# Patient Record
Sex: FEMALE | Race: WHITE
[De-identification: names, ages, dates, MRNs, and addresses within clinical notes are randomized per-mention and may not be internally consistent; named-entity substitution may affect disease eponyms.]

---

## 2018-04-13 ENCOUNTER — HOSPITAL ENCOUNTER (OUTPATIENT)
Dept: HOSPITAL 74 - JASU-SURG | Age: 63
Discharge: HOME | End: 2018-04-13
Attending: OBSTETRICS & GYNECOLOGY
Payer: COMMERCIAL

## 2018-04-13 VITALS — HEART RATE: 75 BPM | SYSTOLIC BLOOD PRESSURE: 130 MMHG | DIASTOLIC BLOOD PRESSURE: 71 MMHG

## 2018-04-13 VITALS — BODY MASS INDEX: 25.9 KG/M2

## 2018-04-13 VITALS — TEMPERATURE: 98.3 F

## 2018-04-13 DIAGNOSIS — N85.8: ICD-10-CM

## 2018-04-13 DIAGNOSIS — N95.0: Primary | ICD-10-CM

## 2018-04-13 PROCEDURE — 0UJD8ZZ INSPECTION OF UTERUS AND CERVIX, VIA NATURAL OR ARTIFICIAL OPENING ENDOSCOPIC: ICD-10-PCS | Performed by: OBSTETRICS & GYNECOLOGY

## 2018-04-13 PROCEDURE — 0UDB7ZX EXTRACTION OF ENDOMETRIUM, VIA NATURAL OR ARTIFICIAL OPENING, DIAGNOSTIC: ICD-10-PCS | Performed by: OBSTETRICS & GYNECOLOGY

## 2018-04-13 NOTE — HP
Past Medical History





- Primary Care Physician


PCP:: Nicho Andujar





- Admission


Chief Complaint: postmenopausal vaginal bleeding, fibroid uterus


History of Present Illness: 





61 yo f with hx of PMB, fibroid uterus admitted for hysteroscopy, D&C , rba 

discussed


History Source: Patient


Limitations to Obtaining History: No Limitations





- Past Medical History


...: 4


...Para: 4





- Past Surgical History


Hx Myomectomy: No


Hx Transabdominal Cerclage: No





- Smoking History


Smoking history: Never smoked





- Alcohol/Substance Use


Hx Alcohol Use: Yes (OCCAS)


History of Substance Use: reports: None





- Social History


History of Recent Travel: No





Home Medications





- Allergies


Allergies/Adverse Reactions: 


 Allergies











Allergy/AdvReac Type Severity Reaction Status Date / Time


 


No Known Allergies Allergy   Verified 18 14:19














- Home Medications


Home Medications: 


Ambulatory Orders





NK [No Known Home Medication]  18 











Review of Systems





- Review of Systems


Constitutional: reports: No Symptoms


Eyes: reports: No Symptoms


HENT: reports: No Symptoms


Neck: reports: No Symptoms


Cardiovascular: reports: No Symptoms


Respiratory: reports: No Symptoms


Gastrointestinal: reports: No Symptoms


Genitourinary: reports: No Symptoms


Breasts: reports: No Symptoms Reported


Musculoskeletal: reports: No Symptoms


Integumentary: reports: No Symptoms


Neurological: reports: No Symptoms


Endocrine: reports: No Symptoms


Hematology/Lymphatic: reports: No Symptoms


Psychiatric: reports: No Symptoms





Physical Exam-GYN


Vital Signs: 


 Vital Signs











Temperature  98.5 F   18 06:47


 


Pulse Rate  80   18 06:47


 


Respiratory Rate  18   18 06:47


 


Blood Pressure  140/77   18 06:47


 


O2 Sat by Pulse Oximetry (%)  98   18 06:49











Constitutional: Yes: Well Nourished, No Distress, Calm


Eyes: Yes: WNL, Conjunctiva Clear, EOM Intact


HENT: Yes: WNL, Atraumatic, Normocephalic


Neck: Yes: WNL, Supple, Trachea Midline


Cardiovascular: Yes: WNL, Regular Rate and Rhythm


Respiratory: Yes: WNL, Regular, CTA Bilaterally


Gastrointestinal: Yes: WNL


...Rectal Exam: Yes: WNL


Renal/: Yes: WNL


Vaginal Exam: Yes: Normal


Cervix: Yes: Normal


Uterus: Yes: Normal, Lumpy (multiple small myomas)


Adnexa: Not Palpable: Left, Right


Breast(s): Yes: WNL


Musculoskeletal: Yes: WNL


Extremities: Yes: WNL


Edema: No


Integumentary: Yes: WNL


Neurological: Yes: WNL, Alert, Oriented


...Motor Strength: WNL


Psychiatric: Yes: WNL, Alert, Oriented





Problem List





- Problem


(1) Postmenopausal vaginal bleeding


Code(s): N95.0 - POSTMENOPAUSAL BLEEDING   





(2) Fibroid, uterine


Code(s): D25.9 - LEIOMYOMA OF UTERUS, UNSPECIFIED   


Qualifiers: 


   Uterine leiomyoma location: intramural   Qualified Code(s): D25.1 - 

Intramural leiomyoma of uterus   





Assessment/Plan





hysteroscopy D&C

## 2018-04-16 NOTE — PATH
Surgical Pathology Report



Patient Name:  JIHAN SIDDIQI

Accession #:  [id number]

[facility]. Rec. #:  [id number]                                                        

   /Age/Gender:  1955 (Age: 62) / F

Account:  [id number]                                                          

             Location: [facility] SURGICAL

Taken:  2018

Received:  2018

Reported:  2018

Physicians:  Nicho Andujar M.D.

  



Specimen(s) Received

 ENDOMETRIAL CURETTINGS 





Clinical History

Postmenopausal bleeding







Final Diagnosis

ENDOMETRIAL CURETTINGS, DILATION AND CURETTAGE:

STRIPS OF ENDOMETRIAL GLANDS CONSISTENT WITH ATROPHIC ENDOMETRIUM AND SCANT

CERVICAL TISSUE.





***Electronically Signed***

Tanya Campos M.D.





Gross Description

Received in formalin labelled "endometrial curetting" is a 1.5 cm greatest

dimension aggregate of hemorrhagic material. Entirely submitted in one cassette.

[facility]/2018



[facility]/2018

## 2018-06-01 NOTE — OP
DATE OF OPERATION:  04/13/2018

 

PREOPERATIVE DIAGNOSIS:  Postmenopausal bleeding.

 

POSTOPERATIVE DIAGNOSIS:  Postmenopausal bleeding, atrophic endometrium.

 

PROCEDURE:  Hysteroscopy, dilatation and curettage. 

 

SURGEON:  Nicho Andujar MD

 

ANESTHESIA:  General.

 

ESTIMATED BLOOD LOSS:  Minimal.  

 

DESCRIPTION OF PROCEDURE:  The patient was taken to the operating room, and under

adequate general anesthesia and dorsal lithotomy position, examination under

anesthesia revealed external genitalia to be normal.  Vagina was normal.  Cervix was

clean, no gross lesion.  Uterus was normal-sized.  Adnexa, no masses were palpable. 

Then, with a weighted speculum in the vagina, anterior lip of the cervix was grasped

with a single-tooth tenaculum.  Cervix was slightly dilated with Hegar dilator.  The

hysteroscope was introduced.  Visualization of the endocervical canal appeared to be

normal.  Endometrium appeared to be atrophic.  Both cornual region was visualized. 

There was a small 1-cm polyp-like lesion in the lower uterine segment which was

removed.  Patient tolerated the procedure well, left the OR in good condition.  

 

 

CAL [id number]

DD: 06/01/2018 07:56

DT: 06/01/2018 08:24

Job #:  [id number]